# Patient Record
Sex: FEMALE | Race: WHITE | NOT HISPANIC OR LATINO | Employment: OTHER
[De-identification: names, ages, dates, MRNs, and addresses within clinical notes are randomized per-mention and may not be internally consistent; named-entity substitution may affect disease eponyms.]

---

## 2017-07-12 ENCOUNTER — CHARTING TRANS (OUTPATIENT)
Dept: OTHER | Age: 72
End: 2017-07-12

## 2017-08-30 ENCOUNTER — CHARTING TRANS (OUTPATIENT)
Dept: OTHER | Age: 72
End: 2017-08-30

## 2017-09-19 ENCOUNTER — HOSPITAL ENCOUNTER (OUTPATIENT)
Dept: GENERAL RADIOLOGY | Age: 72
Discharge: HOME | End: 2017-09-19
Attending: INTERNAL MEDICINE
Payer: MEDICARE

## 2017-09-19 DIAGNOSIS — M53.3 SACROILIAC PAIN: ICD-10-CM

## 2017-09-19 PROCEDURE — 72202 X-RAY EXAM SI JOINTS 3/> VWS: CPT | Performed by: INTERNAL MEDICINE

## 2017-12-19 ENCOUNTER — HOSPITAL ENCOUNTER (OUTPATIENT)
Dept: MAMMOGRAPHY | Age: 72
Discharge: HOME | End: 2017-12-19
Attending: OBSTETRICS & GYNECOLOGY
Payer: MEDICARE

## 2017-12-19 DIAGNOSIS — Z12.31 VISIT FOR SCREENING MAMMOGRAM: ICD-10-CM

## 2017-12-19 PROCEDURE — 77067 SCR MAMMO BI INCL CAD: CPT | Performed by: OBSTETRICS & GYNECOLOGY

## 2018-05-24 ENCOUNTER — CHARTING TRANS (OUTPATIENT)
Dept: OTHER | Age: 73
End: 2018-05-24

## 2018-06-13 ENCOUNTER — LAB SERVICES (OUTPATIENT)
Dept: OTHER | Age: 73
End: 2018-06-13

## 2018-06-13 ENCOUNTER — CHARTING TRANS (OUTPATIENT)
Dept: OTHER | Age: 73
End: 2018-06-13

## 2018-06-15 ENCOUNTER — CHARTING TRANS (OUTPATIENT)
Dept: OTHER | Age: 73
End: 2018-06-15

## 2018-06-15 LAB
CANDIDA RRNA VAG QL PROBE: NEGATIVE
G VAGINALIS RRNA GENITAL QL PROBE: NEGATIVE
T VAGINALIS RRNA GENITAL QL PROBE: NEGATIVE

## 2018-06-20 ENCOUNTER — CHARTING TRANS (OUTPATIENT)
Dept: OTHER | Age: 73
End: 2018-06-20

## 2018-10-24 ENCOUNTER — HOSPITAL ENCOUNTER (OUTPATIENT)
Dept: CV DIAGNOSTICS | Facility: HOSPITAL | Age: 73
Discharge: HOME | End: 2018-10-24
Attending: INTERNAL MEDICINE
Payer: MEDICARE

## 2018-10-24 DIAGNOSIS — R94.31 NONSPECIFIC ABNORMAL ELECTROCARDIOGRAM (ECG) (EKG): ICD-10-CM

## 2018-10-24 PROCEDURE — 93017 CV STRESS TEST TRACING ONLY: CPT | Performed by: INTERNAL MEDICINE

## 2018-10-24 PROCEDURE — 93018 CV STRESS TEST I&R ONLY: CPT | Performed by: INTERNAL MEDICINE

## 2018-10-24 PROCEDURE — 93350 STRESS TTE ONLY: CPT | Performed by: INTERNAL MEDICINE

## 2018-11-01 ENCOUNTER — CHARTING TRANS (OUTPATIENT)
Dept: OTHER | Age: 73
End: 2018-11-01

## 2018-11-01 VITALS
DIASTOLIC BLOOD PRESSURE: 72 MMHG | HEART RATE: 72 BPM | SYSTOLIC BLOOD PRESSURE: 116 MMHG | BODY MASS INDEX: 18.56 KG/M2 | WEIGHT: 111.38 LBS | HEIGHT: 65 IN

## 2018-11-01 VITALS
WEIGHT: 112.99 LBS | SYSTOLIC BLOOD PRESSURE: 112 MMHG | DIASTOLIC BLOOD PRESSURE: 66 MMHG | HEIGHT: 65 IN | BODY MASS INDEX: 18.82 KG/M2

## 2018-11-01 VITALS
WEIGHT: 111.38 LBS | SYSTOLIC BLOOD PRESSURE: 110 MMHG | BODY MASS INDEX: 18.56 KG/M2 | HEART RATE: 70 BPM | HEIGHT: 65 IN | DIASTOLIC BLOOD PRESSURE: 62 MMHG

## 2018-11-03 VITALS — HEART RATE: 70 BPM | DIASTOLIC BLOOD PRESSURE: 64 MMHG | SYSTOLIC BLOOD PRESSURE: 100 MMHG | WEIGHT: 113.13 LBS

## 2018-11-07 ENCOUNTER — HOSPITAL ENCOUNTER (OUTPATIENT)
Dept: BONE DENSITY | Age: 73
Discharge: HOME | End: 2018-11-07
Attending: OBSTETRICS & GYNECOLOGY
Payer: MEDICARE

## 2018-11-07 DIAGNOSIS — Z78.0 OSTEOPENIA AFTER MENOPAUSE: ICD-10-CM

## 2018-11-07 DIAGNOSIS — M85.80 OSTEOPENIA AFTER MENOPAUSE: ICD-10-CM

## 2018-11-07 PROCEDURE — 77080 DXA BONE DENSITY AXIAL: CPT | Performed by: OBSTETRICS & GYNECOLOGY

## 2018-11-27 VITALS
DIASTOLIC BLOOD PRESSURE: 64 MMHG | BODY MASS INDEX: 18.26 KG/M2 | HEIGHT: 65 IN | WEIGHT: 109.56 LBS | SYSTOLIC BLOOD PRESSURE: 108 MMHG

## 2019-01-01 ENCOUNTER — EXTERNAL RECORD (OUTPATIENT)
Dept: HEALTH INFORMATION MANAGEMENT | Facility: OTHER | Age: 74
End: 2019-01-01

## 2019-01-05 ENCOUNTER — HOSPITAL ENCOUNTER (OUTPATIENT)
Dept: MAMMOGRAPHY | Age: 74
Discharge: HOME | End: 2019-01-05
Attending: OBSTETRICS & GYNECOLOGY
Payer: MEDICARE

## 2019-01-05 DIAGNOSIS — Z12.31 ENCOUNTER FOR SCREENING MAMMOGRAM FOR MALIGNANT NEOPLASM OF BREAST: ICD-10-CM

## 2019-01-05 PROCEDURE — 77067 SCR MAMMO BI INCL CAD: CPT | Performed by: OBSTETRICS & GYNECOLOGY

## 2019-01-05 PROCEDURE — 77063 BREAST TOMOSYNTHESIS BI: CPT | Performed by: OBSTETRICS & GYNECOLOGY

## 2019-02-28 ENCOUNTER — HOSPITAL ENCOUNTER (OUTPATIENT)
Dept: MRI IMAGING | Age: 74
Discharge: HOME | End: 2019-02-28
Attending: INTERNAL MEDICINE
Payer: MEDICARE

## 2019-02-28 DIAGNOSIS — M54.17 LUMBOSACRAL RADICULOPATHY: ICD-10-CM

## 2019-02-28 PROCEDURE — 72148 MRI LUMBAR SPINE W/O DYE: CPT | Performed by: INTERNAL MEDICINE

## 2019-08-13 ENCOUNTER — OFFICE VISIT (OUTPATIENT)
Dept: OBGYN | Age: 74
End: 2019-08-13

## 2019-08-13 VITALS
WEIGHT: 110.56 LBS | BODY MASS INDEX: 18.42 KG/M2 | DIASTOLIC BLOOD PRESSURE: 58 MMHG | HEIGHT: 65 IN | SYSTOLIC BLOOD PRESSURE: 90 MMHG

## 2019-08-13 DIAGNOSIS — M85.851 OSTEOPENIA OF BOTH HIPS: ICD-10-CM

## 2019-08-13 DIAGNOSIS — M85.852 OSTEOPENIA OF BOTH HIPS: ICD-10-CM

## 2019-08-13 DIAGNOSIS — Z12.39 SCREENING FOR BREAST CANCER: Primary | ICD-10-CM

## 2019-08-13 PROCEDURE — G0101 CA SCREEN;PELVIC/BREAST EXAM: HCPCS | Performed by: OBSTETRICS & GYNECOLOGY

## 2019-08-13 RX ORDER — POLYETHYLENE GLYCOL 3350 17 G/17G
34 POWDER, FOR SOLUTION ORAL
COMMUNITY

## 2019-08-13 RX ORDER — DEXTROAMPHETAMINE SACCHARATE, AMPHETAMINE ASPARTATE, DEXTROAMPHETAMINE SULFATE AND AMPHETAMINE SULFATE 2.5; 2.5; 2.5; 2.5 MG/1; MG/1; MG/1; MG/1
1 TABLET ORAL
COMMUNITY

## 2019-08-13 RX ORDER — LEVOTHYROXINE SODIUM 112 UG/1
112 TABLET ORAL DAILY
COMMUNITY

## 2019-08-13 RX ORDER — ARIPIPRAZOLE 2 MG/1
1 TABLET ORAL
COMMUNITY

## 2019-08-13 RX ORDER — FLUOXETINE 20 MG/1
1 TABLET ORAL
COMMUNITY

## 2019-08-13 ASSESSMENT — ENCOUNTER SYMPTOMS
NUMBNESS: 1
CONSTIPATION: 1
RESPIRATORY NEGATIVE: 1
NERVOUS/ANXIOUS: 0
APPETITE CHANGE: 0
HEADACHES: 1
ACTIVITY CHANGE: 0
UNEXPECTED WEIGHT CHANGE: 0
FATIGUE: 0

## 2020-01-01 ENCOUNTER — EXTERNAL RECORD (OUTPATIENT)
Dept: HEALTH INFORMATION MANAGEMENT | Facility: OTHER | Age: 75
End: 2020-01-01

## 2020-01-18 ENCOUNTER — HOSPITAL ENCOUNTER (OUTPATIENT)
Dept: MAMMOGRAPHY | Age: 75
Discharge: HOME | End: 2020-01-18
Attending: OBSTETRICS & GYNECOLOGY
Payer: MEDICARE

## 2020-01-18 DIAGNOSIS — Z12.31 ENCOUNTER FOR SCREENING MAMMOGRAM FOR MALIGNANT NEOPLASM OF BREAST: ICD-10-CM

## 2020-01-18 PROCEDURE — 77067 SCR MAMMO BI INCL CAD: CPT | Performed by: OBSTETRICS & GYNECOLOGY

## 2020-01-18 PROCEDURE — 77063 BREAST TOMOSYNTHESIS BI: CPT | Performed by: OBSTETRICS & GYNECOLOGY

## 2020-01-21 DIAGNOSIS — R92.8 ABNORMAL MAMMOGRAM: Primary | ICD-10-CM

## 2020-01-30 ENCOUNTER — HOSPITAL ENCOUNTER (OUTPATIENT)
Dept: MAMMOGRAPHY | Facility: HOSPITAL | Age: 75
Discharge: HOME | End: 2020-01-30
Attending: OBSTETRICS & GYNECOLOGY
Payer: MEDICARE

## 2020-01-30 DIAGNOSIS — R92.2 INCONCLUSIVE MAMMOGRAM: ICD-10-CM

## 2020-01-30 PROCEDURE — 76642 ULTRASOUND BREAST LIMITED: CPT | Performed by: OBSTETRICS & GYNECOLOGY

## 2020-01-30 PROCEDURE — 77065 DX MAMMO INCL CAD UNI: CPT | Performed by: OBSTETRICS & GYNECOLOGY

## 2020-01-30 PROCEDURE — 77061 BREAST TOMOSYNTHESIS UNI: CPT | Performed by: OBSTETRICS & GYNECOLOGY

## 2020-02-22 ENCOUNTER — HOSPITAL ENCOUNTER (OUTPATIENT)
Dept: BONE DENSITY | Age: 75
Discharge: HOME | End: 2020-02-22
Attending: OBSTETRICS & GYNECOLOGY
Payer: MEDICARE

## 2020-02-22 DIAGNOSIS — M85.851 OSTEOPENIA OF BOTH HIPS: ICD-10-CM

## 2020-02-22 DIAGNOSIS — M85.852 OSTEOPENIA OF BOTH HIPS: ICD-10-CM

## 2020-02-22 PROCEDURE — 77080 DXA BONE DENSITY AXIAL: CPT | Performed by: OBSTETRICS & GYNECOLOGY

## 2020-10-19 ENCOUNTER — APPOINTMENT (OUTPATIENT)
Dept: LAB | Facility: HOSPITAL | Age: 75
End: 2020-10-19
Attending: PHYSICIAN ASSISTANT
Payer: MEDICARE

## 2020-10-19 DIAGNOSIS — Z11.59 ENCOUNTER FOR SCREENING FOR OTHER VIRAL DISEASES: ICD-10-CM

## 2020-10-19 DIAGNOSIS — Z01.818 PREPROCEDURAL EXAMINATION: ICD-10-CM

## 2020-10-22 ENCOUNTER — HOSPITAL ENCOUNTER (OUTPATIENT)
Dept: CV DIAGNOSTICS | Facility: HOSPITAL | Age: 75
Discharge: HOME | End: 2020-10-22
Attending: PHYSICIAN ASSISTANT
Payer: MEDICARE

## 2020-10-22 ENCOUNTER — HOSPITAL ENCOUNTER (OUTPATIENT)
Dept: GENERAL RADIOLOGY | Facility: HOSPITAL | Age: 75
Discharge: HOME | End: 2020-10-22
Attending: PHYSICIAN ASSISTANT
Payer: MEDICARE

## 2020-10-22 DIAGNOSIS — R07.9 CHEST PAIN, UNSPECIFIED TYPE: ICD-10-CM

## 2020-10-22 PROCEDURE — 93350 STRESS TTE ONLY: CPT | Performed by: PHYSICIAN ASSISTANT

## 2020-10-22 PROCEDURE — 93017 CV STRESS TEST TRACING ONLY: CPT | Performed by: PHYSICIAN ASSISTANT

## 2020-10-22 PROCEDURE — 71046 X-RAY EXAM CHEST 2 VIEWS: CPT | Performed by: PHYSICIAN ASSISTANT

## 2020-10-22 PROCEDURE — 93018 CV STRESS TEST I&R ONLY: CPT | Performed by: PHYSICIAN ASSISTANT

## 2021-01-05 ENCOUNTER — TELEPHONE (OUTPATIENT)
Dept: OBGYN | Age: 76
End: 2021-01-05

## 2021-01-13 DIAGNOSIS — Z12.31 ENCOUNTER FOR SCREENING MAMMOGRAM FOR MALIGNANT NEOPLASM OF BREAST: Primary | ICD-10-CM

## 2021-02-03 ENCOUNTER — HOSPITAL ENCOUNTER (OUTPATIENT)
Dept: MAMMOGRAPHY | Age: 76
Discharge: HOME | End: 2021-02-03
Attending: OBSTETRICS & GYNECOLOGY
Payer: MEDICARE

## 2021-02-03 DIAGNOSIS — Z12.31 ENCOUNTER FOR SCREENING MAMMOGRAM FOR MALIGNANT NEOPLASM OF BREAST: ICD-10-CM

## 2021-02-03 PROCEDURE — 77067 SCR MAMMO BI INCL CAD: CPT | Performed by: OBSTETRICS & GYNECOLOGY

## 2021-02-03 PROCEDURE — 77063 BREAST TOMOSYNTHESIS BI: CPT | Performed by: OBSTETRICS & GYNECOLOGY

## 2021-10-12 ENCOUNTER — HOSPITAL ENCOUNTER (OUTPATIENT)
Dept: CT IMAGING | Facility: HOSPITAL | Age: 76
Discharge: HOME | End: 2021-10-12
Attending: UROLOGY
Payer: MEDICARE

## 2021-10-12 DIAGNOSIS — N13.30 HYDRONEPHROSIS, UNSPECIFIED HYDRONEPHROSIS TYPE: ICD-10-CM

## 2021-10-12 PROCEDURE — 76377 3D RENDER W/INTRP POSTPROCES: CPT | Performed by: UROLOGY

## 2021-10-12 PROCEDURE — 82565 ASSAY OF CREATININE: CPT

## 2021-10-12 PROCEDURE — 74178 CT ABD&PLV WO CNTR FLWD CNTR: CPT | Performed by: UROLOGY

## 2021-10-25 PROCEDURE — 88305 TISSUE EXAM BY PATHOLOGIST: CPT | Performed by: INTERNAL MEDICINE

## 2021-11-02 ENCOUNTER — OFFICE VISIT (OUTPATIENT)
Dept: OBGYN | Age: 76
End: 2021-11-02

## 2021-11-02 VITALS
SYSTOLIC BLOOD PRESSURE: 107 MMHG | DIASTOLIC BLOOD PRESSURE: 72 MMHG | HEART RATE: 101 BPM | BODY MASS INDEX: 18.86 KG/M2 | HEIGHT: 64 IN | WEIGHT: 110.45 LBS

## 2021-11-02 DIAGNOSIS — Z12.31 ENCOUNTER FOR SCREENING MAMMOGRAM FOR MALIGNANT NEOPLASM OF BREAST: Primary | ICD-10-CM

## 2021-11-02 DIAGNOSIS — Z23 NEED FOR INFLUENZA VACCINATION: ICD-10-CM

## 2021-11-02 PROCEDURE — G0008 ADMIN INFLUENZA VIRUS VAC: HCPCS

## 2021-11-02 PROCEDURE — 90686 IIV4 VACC NO PRSV 0.5 ML IM: CPT

## 2021-11-02 PROCEDURE — G0101 CA SCREEN;PELVIC/BREAST EXAM: HCPCS | Performed by: OBSTETRICS & GYNECOLOGY

## 2021-11-02 ASSESSMENT — ENCOUNTER SYMPTOMS
CONSTIPATION: 1
FATIGUE: 0
UNEXPECTED WEIGHT CHANGE: 1
LIGHT-HEADEDNESS: 1
DIARRHEA: 0
ACTIVITY CHANGE: 0
ABDOMINAL DISTENTION: 1
CHILLS: 0
FEVER: 0
APPETITE CHANGE: 0
NERVOUS/ANXIOUS: 0
RESPIRATORY NEGATIVE: 1
DIZZINESS: 0
BACK PAIN: 1

## 2022-02-04 ENCOUNTER — HOSPITAL ENCOUNTER (OUTPATIENT)
Dept: MAMMOGRAPHY | Age: 77
Discharge: HOME | End: 2022-02-04
Attending: OBSTETRICS & GYNECOLOGY
Payer: MEDICARE

## 2022-02-04 DIAGNOSIS — Z12.31 ENCOUNTER FOR SCREENING MAMMOGRAM FOR MALIGNANT NEOPLASM OF BREAST: ICD-10-CM

## 2022-02-04 PROCEDURE — 77063 BREAST TOMOSYNTHESIS BI: CPT | Performed by: OBSTETRICS & GYNECOLOGY

## 2022-02-04 PROCEDURE — 77067 SCR MAMMO BI INCL CAD: CPT | Performed by: OBSTETRICS & GYNECOLOGY

## 2022-12-19 ENCOUNTER — HOSPITAL ENCOUNTER (OUTPATIENT)
Dept: CT IMAGING | Facility: HOSPITAL | Age: 77
Discharge: HOME | End: 2022-12-19
Attending: INTERNAL MEDICINE
Payer: MEDICARE

## 2022-12-19 DIAGNOSIS — R91.1 PULMONARY NODULE: ICD-10-CM

## 2022-12-19 PROCEDURE — 71250 CT THORAX DX C-: CPT | Performed by: INTERNAL MEDICINE

## 2023-02-06 ENCOUNTER — HOSPITAL ENCOUNTER (OUTPATIENT)
Dept: MAMMOGRAPHY | Age: 78
Discharge: HOME | End: 2023-02-06
Attending: INTERNAL MEDICINE
Payer: MEDICARE

## 2023-02-06 DIAGNOSIS — Z12.31 ENCOUNTER FOR SCREENING MAMMOGRAM FOR MALIGNANT NEOPLASM OF BREAST: ICD-10-CM

## 2023-02-06 PROCEDURE — 77067 SCR MAMMO BI INCL CAD: CPT | Performed by: INTERNAL MEDICINE

## 2023-02-06 PROCEDURE — 77063 BREAST TOMOSYNTHESIS BI: CPT | Performed by: INTERNAL MEDICINE

## 2023-05-23 ENCOUNTER — HOSPITAL ENCOUNTER (OUTPATIENT)
Dept: BONE DENSITY | Age: 78
Discharge: HOME | End: 2023-05-23
Attending: INTERNAL MEDICINE
Payer: MEDICARE

## 2023-05-23 DIAGNOSIS — M85.89 OSTEOPENIA OF MULTIPLE SITES: ICD-10-CM

## 2023-05-23 DIAGNOSIS — Z78.0 POSTMENOPAUSAL: ICD-10-CM

## 2023-05-23 PROCEDURE — 77080 DXA BONE DENSITY AXIAL: CPT | Performed by: INTERNAL MEDICINE

## 2023-10-24 ENCOUNTER — E-ADVICE (OUTPATIENT)
Dept: OTHER | Age: 78
End: 2023-10-24

## 2023-11-03 ENCOUNTER — OFFICE VISIT (OUTPATIENT)
Dept: OBGYN | Age: 78
End: 2023-11-03

## 2023-11-03 VITALS
DIASTOLIC BLOOD PRESSURE: 85 MMHG | WEIGHT: 106.37 LBS | BODY MASS INDEX: 18.26 KG/M2 | OXYGEN SATURATION: 100 % | RESPIRATION RATE: 16 BRPM | SYSTOLIC BLOOD PRESSURE: 105 MMHG

## 2023-11-03 DIAGNOSIS — Z12.31 ENCOUNTER FOR SCREENING MAMMOGRAM FOR MALIGNANT NEOPLASM OF BREAST: Primary | ICD-10-CM

## 2023-11-03 DIAGNOSIS — N89.8 VAGINAL ODOR: ICD-10-CM

## 2023-11-03 DIAGNOSIS — N89.8 VAGINAL DISCHARGE: ICD-10-CM

## 2023-11-03 PROCEDURE — 87481 CANDIDA DNA AMP PROBE: CPT | Performed by: CLINICAL MEDICAL LABORATORY

## 2023-11-03 PROCEDURE — 87661 TRICHOMONAS VAGINALIS AMPLIF: CPT | Performed by: CLINICAL MEDICAL LABORATORY

## 2023-11-03 PROCEDURE — 81513 NFCT DS BV RNA VAG FLU ALG: CPT | Performed by: CLINICAL MEDICAL LABORATORY

## 2023-11-03 PROCEDURE — G0101 CA SCREEN;PELVIC/BREAST EXAM: HCPCS | Performed by: OBSTETRICS & GYNECOLOGY

## 2023-11-03 RX ORDER — ROSUVASTATIN CALCIUM 20 MG/1
20 TABLET, COATED ORAL DAILY
COMMUNITY

## 2023-11-03 ASSESSMENT — ENCOUNTER SYMPTOMS
DIARRHEA: 0
CONSTIPATION: 1
UNEXPECTED WEIGHT CHANGE: 1
ACTIVITY CHANGE: 1
NAUSEA: 0
RESPIRATORY NEGATIVE: 1
FEVER: 0
APPETITE CHANGE: 0
NERVOUS/ANXIOUS: 0
ABDOMINAL DISTENTION: 1
CHILLS: 0

## 2023-11-03 ASSESSMENT — PATIENT HEALTH QUESTIONNAIRE - PHQ9
SUM OF ALL RESPONSES TO PHQ9 QUESTIONS 1 AND 2: 0
2. FEELING DOWN, DEPRESSED OR HOPELESS: NOT AT ALL
SUM OF ALL RESPONSES TO PHQ9 QUESTIONS 1 AND 2: 0
1. LITTLE INTEREST OR PLEASURE IN DOING THINGS: NOT AT ALL
CLINICAL INTERPRETATION OF PHQ2 SCORE: NO FURTHER SCREENING NEEDED

## 2023-11-03 ASSESSMENT — PAIN SCALES - GENERAL: PAINLEVEL: 0

## 2023-11-06 LAB
BACTERIAL VAGINOSIS VAG-IMP: NOT DETECTED
C ALBICANS DNA VAG QL NAA+PROBE: NOT DETECTED
C GLABRATA DNA VAG QL NAA+PROBE: NOT DETECTED
SERVICE CMNT-IMP: NORMAL
T VAGINALIS DNA VAG QL NAA+PROBE: NOT DETECTED

## 2024-02-07 ENCOUNTER — HOSPITAL ENCOUNTER (OUTPATIENT)
Dept: MAMMOGRAPHY | Age: 79
Discharge: HOME | End: 2024-02-07
Attending: OBSTETRICS & GYNECOLOGY
Payer: MEDICARE

## 2024-02-07 DIAGNOSIS — Z12.31 ENCOUNTER FOR SCREENING MAMMOGRAM FOR MALIGNANT NEOPLASM OF BREAST: ICD-10-CM

## 2024-02-07 PROCEDURE — 77063 BREAST TOMOSYNTHESIS BI: CPT | Performed by: OBSTETRICS & GYNECOLOGY

## 2024-02-07 PROCEDURE — 77067 SCR MAMMO BI INCL CAD: CPT | Performed by: OBSTETRICS & GYNECOLOGY

## 2024-03-26 ENCOUNTER — TELEPHONE (OUTPATIENT)
Dept: OBGYN | Age: 79
End: 2024-03-26

## 2024-05-13 ENCOUNTER — TELEPHONE (OUTPATIENT)
Dept: OBGYN | Age: 79
End: 2024-05-13

## 2024-05-14 ENCOUNTER — OFFICE VISIT (OUTPATIENT)
Dept: OBGYN | Age: 79
End: 2024-05-14

## 2024-05-14 VITALS — BODY MASS INDEX: 17.86 KG/M2 | WEIGHT: 104.06 LBS

## 2024-05-14 DIAGNOSIS — N95.0 POSTMENOPAUSAL BLEEDING: Primary | ICD-10-CM

## 2024-05-14 RX ORDER — FLUOXETINE HYDROCHLORIDE 20 MG/1
20 CAPSULE ORAL EVERY MORNING
COMMUNITY

## 2024-05-14 ASSESSMENT — PATIENT HEALTH QUESTIONNAIRE - PHQ9
2. FEELING DOWN, DEPRESSED OR HOPELESS: NOT AT ALL
1. LITTLE INTEREST OR PLEASURE IN DOING THINGS: NOT AT ALL
SUM OF ALL RESPONSES TO PHQ9 QUESTIONS 1 AND 2: 0
SUM OF ALL RESPONSES TO PHQ9 QUESTIONS 1 AND 2: 0
CLINICAL INTERPRETATION OF PHQ2 SCORE: NO FURTHER SCREENING NEEDED

## 2024-05-14 ASSESSMENT — ENCOUNTER SYMPTOMS
NERVOUS/ANXIOUS: 0
BLOOD IN STOOL: 0
ROS GI COMMENTS: HEMORRHOIDS
CONSTITUTIONAL NEGATIVE: 1
COLOR CHANGE: 0
WOUND: 1

## 2024-05-23 ENCOUNTER — APPOINTMENT (OUTPATIENT)
Dept: ULTRASOUND IMAGING | Age: 79
End: 2024-05-23
Attending: OBSTETRICS & GYNECOLOGY

## 2024-05-23 DIAGNOSIS — N95.0 POSTMENOPAUSAL BLEEDING: ICD-10-CM

## 2024-05-23 PROCEDURE — 76856 US EXAM PELVIC COMPLETE: CPT | Performed by: OBSTETRICS & GYNECOLOGY

## 2024-05-23 PROCEDURE — 76830 TRANSVAGINAL US NON-OB: CPT | Performed by: OBSTETRICS & GYNECOLOGY

## 2024-11-11 ENCOUNTER — OFFICE VISIT (OUTPATIENT)
Dept: UROLOGY | Facility: CLINIC | Age: 79
End: 2024-11-11
Attending: OBSTETRICS & GYNECOLOGY
Payer: MEDICARE

## 2024-11-11 VITALS
DIASTOLIC BLOOD PRESSURE: 70 MMHG | BODY MASS INDEX: 18.61 KG/M2 | TEMPERATURE: 98 F | HEIGHT: 63 IN | WEIGHT: 105 LBS | SYSTOLIC BLOOD PRESSURE: 100 MMHG

## 2024-11-11 DIAGNOSIS — K59.00 CONSTIPATION: ICD-10-CM

## 2024-11-11 DIAGNOSIS — R35.1 NOCTURIA: ICD-10-CM

## 2024-11-11 DIAGNOSIS — N81.11 CYSTOCELE, MIDLINE: Primary | ICD-10-CM

## 2024-11-11 DIAGNOSIS — N81.6 RECTOCELE: ICD-10-CM

## 2024-11-11 PROCEDURE — 99202 OFFICE O/P NEW SF 15 MIN: CPT

## 2024-11-11 RX ORDER — ESTRADIOL 0.1 MG/G
CREAM VAGINAL
Qty: 42.5 G | Refills: 3 | Status: SHIPPED | OUTPATIENT
Start: 2024-11-11

## 2024-11-11 RX ORDER — DORZOLAMIDE HYDROCHLORIDE AND TIMOLOL MALEATE 20; 5 MG/ML; MG/ML
1 SOLUTION/ DROPS OPHTHALMIC 2 TIMES DAILY
COMMUNITY
Start: 2024-02-05

## 2024-11-11 RX ORDER — LEVOTHYROXINE SODIUM 100 UG/1
100 TABLET ORAL DAILY
COMMUNITY
Start: 2024-09-09

## 2024-11-11 RX ORDER — POLYETHYLENE GLYCOL 3350 17 G/17G
17 POWDER, FOR SOLUTION ORAL 2 TIMES DAILY
COMMUNITY

## 2024-11-11 RX ORDER — LATANOPROST 50 UG/ML
1 SOLUTION/ DROPS OPHTHALMIC NIGHTLY
COMMUNITY
Start: 2023-03-20

## 2024-11-11 RX ORDER — ROSUVASTATIN CALCIUM 20 MG/1
20 TABLET, COATED ORAL EVERY OTHER DAY
COMMUNITY
Start: 2024-08-31

## 2025-02-10 ENCOUNTER — OFFICE VISIT (OUTPATIENT)
Dept: UROLOGY | Facility: CLINIC | Age: 80
End: 2025-02-10
Attending: OBSTETRICS & GYNECOLOGY
Payer: MEDICARE

## 2025-02-10 VITALS — RESPIRATION RATE: 16 BRPM | TEMPERATURE: 98 F | BODY MASS INDEX: 19 KG/M2 | HEIGHT: 63 IN

## 2025-02-10 DIAGNOSIS — N39.41 URGE URINARY INCONTINENCE: ICD-10-CM

## 2025-02-10 DIAGNOSIS — R39.14 FEELING OF INCOMPLETE BLADDER EMPTYING: ICD-10-CM

## 2025-02-10 DIAGNOSIS — R35.1 NOCTURIA: ICD-10-CM

## 2025-02-10 DIAGNOSIS — N81.2 INCOMPLETE UTEROVAGINAL PROLAPSE: Primary | ICD-10-CM

## 2025-02-10 DIAGNOSIS — N81.84 PELVIC MUSCLE WASTING: ICD-10-CM

## 2025-02-10 DIAGNOSIS — N95.2 POSTMENOPAUSAL ATROPHIC VAGINITIS: ICD-10-CM

## 2025-02-10 PROCEDURE — 99212 OFFICE O/P EST SF 10 MIN: CPT

## 2025-02-10 PROCEDURE — 51701 INSERT BLADDER CATHETER: CPT | Performed by: PHYSICIAN ASSISTANT

## 2025-02-10 RX ORDER — VIBEGRON 75 MG/1
1 TABLET, FILM COATED ORAL DAILY
Qty: 90 TABLET | Refills: 3 | Status: SHIPPED | OUTPATIENT
Start: 2025-02-10

## 2025-02-10 NOTE — PROGRESS NOTES
Pt presents for f/u of pelvic organ prolapse, pessary care  Using #5 ring with support pessary, office care    Reports pessary is comfortable   Denies discharge  Denies bleeding  Denies s/sx of UTI  Bowels reg  Pt is using vaginal estrogen cream 3x weekly    Happy with pessary, feels supported  She is not currently interested in surgical management of her pelvic organ prolapse    Reports more bothersome UUI  Nocturia x3  Has tried anticholinergic meds in past (worsening constipation)    Urogynecology Summary:  Urogynecology Summary  Prolapse: Yes  GILMER: Yes  Urge Incontinence: Yes  Nocturia Frequency: 3  Frequency: 2 hours  Incomplete emptying: Yes (occasionally)  Constipation: Yes  Wears pad day?: 3 (light)  Wears Pad Night?: 1 (light)  Activities are limited by UI/POP?: No  Currently Sexually Active: No      Vitals:  Vitals:    02/10/25 1032   Resp: 16   Temp: 97.8 °F (36.6 °C)       GENERAL EXAM:  GENERAL: alert & oriented, NAD  HEENT: NC/AT, sclera without injection  SKIN: no rashes  LUNGS:  without increased respiratory effort  ABDOMEN: soft, non-tender, non-distended, no masses appreciated  EXT: no edema    PELVIC EXAM: HERMES Busch, present for exam as a chaperone  Ext. Gen: +atrophy, no lesions  Urethra: +atrophy, nontender  Bladder: no fullness, nontender  Vagina: +atrophy, no lesions, no ulcerations, no bleeding  Cervix: wnl, no lesions, no bleeding  Uterus: +mobile  Perineum: intact, nontender  Anus: wnl  Rectum: defer     PELVIS FLOOR NEUROMUSCULAR FUNCTION:  Strength: 2  Perineal Sensation: intact    PELVIC SUPPORT:  [place]: 2  Ant: 2  Post: 2  CST: neg  UVJ: not hypermobile    Pt voided 100 mL in the privacy of the bathroom  After obtaining verbal consent from the patient, sterile technique used to prep urethra and collect urine.  PVR 20 mL.    Her pessary was removed, cleaned, and reinserted without difficulty.    Impression:    ICD-10-CM    1. Incomplete uterovaginal prolapse  N81.2       2. Postmenopausal  atrophic vaginitis  N95.2       3. Pelvic muscle wasting  N81.84       4. Urge urinary incontinence  N39.41 Vibegron (GEMTESA) 75 MG Oral Tab      5. Nocturia  R35.1 Vibegron (GEMTESA) 75 MG Oral Tab      6. Feeling of incomplete bladder emptying  R39.14 Straight Cath PVR          Discussion Items:   Discussed mgmt of vulvovaginal atrophy with vaginal estrogen cream. Reviewed associated benefits, risks, alternatives, and goals. Recommend low dose 3x weekly mgmt.     Discussed management of pelvic organ prolapse including but not limited to behavioral modifications, conservative options, and surgical management.   Discussed pessary management including benefits and risks. Discussed importance of keeping regularly scheduled pessary checks in prevention of complications related to pessary use.     Discussed dietary and behavioral modifications for mgmt of urinary symptoms.  Discussed weight management and benefits of weight loss on urinary symptoms.  Reviewed AUA/SUFU guidelines on mgmt of non-neurogenic OAB.  Discussed pharmacologic and nonpharmacologic mgmt options of urinary symptoms - reviewed risks, benefits, alternatives, and goals of treatment.  Discussed specific risks related to OAB meds including, but not limited to dry mouth, constipation, blurry vision, cognitive changes, and BP elevation.      Treatment Plan:  Start Gemtesa 75 mg  -- avoid mirabegron d/t drug interactions and anticholinergics d/t age and potential SEs  Continue pessary management, office care  Continue vag estrogen as prescribed  Encouraged daily pelvic exercises  Cont bowel regimen  Bladder diet/drill  Call with s/sx of UTI, problems with pessary     All questions answered  Pt understands and agrees to plan       Return in about 6 weeks (around 3/24/2025) for UUI (phone), and 3 months for pessary care.      Brenda Magana PA-C    Note to patient: The 21st Century Cures Act makes medical notes like these available to patients in the  interest of transparency.  However, be advised this is a medical document.  It is intended as peer to peer communication.  It is written in medical language and may contain abbreviations or verbiage that are unfamiliar.  It may appear blunt or direct.  Medical documents are intended to carry relevant information, facts as evident, and the clinical opinion of the practitioner.

## 2025-02-11 ENCOUNTER — TELEPHONE (OUTPATIENT)
Dept: UROLOGY | Facility: CLINIC | Age: 80
End: 2025-02-11

## 2025-02-11 ENCOUNTER — HOSPITAL ENCOUNTER (OUTPATIENT)
Dept: MAMMOGRAPHY | Age: 80
Discharge: HOME | End: 2025-02-11
Attending: INTERNAL MEDICINE
Payer: MEDICARE

## 2025-02-11 DIAGNOSIS — Z12.31 ENCOUNTER FOR SCREENING MAMMOGRAM FOR MALIGNANT NEOPLASM OF BREAST: ICD-10-CM

## 2025-02-11 PROCEDURE — 77063 BREAST TOMOSYNTHESIS BI: CPT | Performed by: INTERNAL MEDICINE

## 2025-02-11 PROCEDURE — 77067 SCR MAMMO BI INCL CAD: CPT | Performed by: INTERNAL MEDICINE

## 2025-02-11 NOTE — TELEPHONE ENCOUNTER
TC from pt reporting gemtesa will cost $196/mo.  Per CARLITA Castro notes, myrbetriq is contraindicated d/t other medications. Has tried anticholinergics w/ c/o constipation.   Pt willing to try trospium. Pt has hx constipation, taking benefiber and miralax BID already.   Discussed PTNS. Pt interested in this. Will send information to pt today.

## 2025-02-12 RX ORDER — TROSPIUM CHLORIDE ER 60 MG/1
60 CAPSULE ORAL DAILY
Qty: 30 CAPSULE | Refills: 11 | Status: SHIPPED | OUTPATIENT
Start: 2025-02-12

## 2025-02-12 NOTE — TELEPHONE ENCOUNTER
Called pt, LMOR with new orders.  Pt to keep scheduled f/u with AIDE ZAZUETA 3/24/25  Pt to call with questions

## 2025-03-24 ENCOUNTER — VIRTUAL PHONE E/M (OUTPATIENT)
Dept: UROLOGY | Facility: CLINIC | Age: 80
End: 2025-03-24
Attending: OBSTETRICS & GYNECOLOGY
Payer: MEDICARE

## 2025-03-24 DIAGNOSIS — N81.84 PELVIC MUSCLE WASTING: ICD-10-CM

## 2025-03-24 DIAGNOSIS — R35.1 NOCTURIA: ICD-10-CM

## 2025-03-24 DIAGNOSIS — N95.2 POSTMENOPAUSAL ATROPHIC VAGINITIS: ICD-10-CM

## 2025-03-24 DIAGNOSIS — N39.41 URGE URINARY INCONTINENCE: Primary | ICD-10-CM

## 2025-03-24 NOTE — PROGRESS NOTES
This visit is being conducted as a phone (audio only) visit with patient's consent.  Patient declined video visit due to technical capacity.  Patient is in a safe, private environment for televisit, provider located in the office setting.    Visit for f/u of UUI    Rx'd trospium ER 60 mg last visit  Denies SEs but stopped after one week d/t lack of improvement  Willing to retry    Using vag estrogen 3x weekly    Bowels reg     Denies any current signs or symptoms of UTI    Previously tried:  Gemtesa (unable to fill d/t cost 3/25)    Impression/Plan:    ICD-10-CM    1. Urge urinary incontinence  N39.41       2. Nocturia  R35.1       3. Postmenopausal atrophic vaginitis  N95.2       4. Pelvic muscle wasting  N81.84           Discussion Items:   Discussed dietary and behavioral modifications for mgmt of urinary symptoms.  Discussed weight management and benefits of weight loss on urinary symptoms.  Reviewed AUA/SUFU guidelines on mgmt of non-neurogenic OAB.  Discussed pharmacologic and nonpharmacologic mgmt options of urinary symptoms - reviewed risks, benefits, alternatives, and goals of treatment.  Discussed specific risks related to OAB meds including, but not limited to dry mouth, constipation, blurry vision, cognitive changes, and BP elevation.  -- avoid mirabegron d/t drug interaction with aripiprazole    Treatment Plan:  Restart trospium ER 60 mg daily on empty stomach  Continue vag estrogen as prescribed  Bowel regimen  Bladder diet/drill  Pelvic floor exercises  Call with s/sx of UTI    All questions answered  She understands and agrees to plan    Return in about 2 months (around 5/24/2025) for UUI (as already scheduled).    Brenda Magana PA-C    A minimum of 10 minutes was spent on this encounter including chart review, discussion with patient/family, and documentation.    Note to patient: The 21st Century Cures Act makes medical notes like these available to patients in the interest of transparency.   However, be advised this is a medical document.  It is intended as peer to peer communication.  It is written in medical language and may contain abbreviations or verbiage that are unfamiliar.  It may appear blunt or direct.  Medical documents are intended to carry relevant information, facts as evident, and the clinical opinion of the practitioner.

## 2025-05-21 ENCOUNTER — OFFICE VISIT (OUTPATIENT)
Dept: UROLOGY | Facility: CLINIC | Age: 80
End: 2025-05-21
Attending: OBSTETRICS & GYNECOLOGY
Payer: MEDICARE

## 2025-05-21 VITALS — BODY MASS INDEX: 19 KG/M2 | TEMPERATURE: 98 F | RESPIRATION RATE: 16 BRPM | HEIGHT: 63 IN

## 2025-05-21 DIAGNOSIS — N39.41 URGE URINARY INCONTINENCE: ICD-10-CM

## 2025-05-21 DIAGNOSIS — R35.1 NOCTURIA: ICD-10-CM

## 2025-05-21 DIAGNOSIS — N81.84 PELVIC MUSCLE WASTING: ICD-10-CM

## 2025-05-21 DIAGNOSIS — N95.2 POSTMENOPAUSAL ATROPHIC VAGINITIS: ICD-10-CM

## 2025-05-21 DIAGNOSIS — N81.2 INCOMPLETE UTEROVAGINAL PROLAPSE: Primary | ICD-10-CM

## 2025-05-21 PROCEDURE — 99212 OFFICE O/P EST SF 10 MIN: CPT

## 2025-05-21 NOTE — PROGRESS NOTES
Pt presents for f/u of pelvic organ prolapse, pessary care  Using #5 ring with support pessary, office care    Reports pessary is comfortable   Denies discharge  Denies bleeding  Denies s/sx of UTI  Bowels soft but reports straining, using miralax  Pt is using vaginal estrogen cream 3x weekly    Happy with pessary, feels supported  She is not currently interested in surgical management of her pelvic organ prolapse    Reports UUI  Tried trospium ER 60 mg for 1-2 weeks, stopped  Not keen on meds    Urogynecology Summary:  Urogynecology Summary  Prolapse: Yes  GILMER: No  Urge Incontinence: Yes (occasionally)  Nocturia Frequency: 3  Frequency: 2 hours  Incomplete emptying: No  Constipation: Yes  Wears pad day?: 2 (light)  Wears Pad Night?: 1 (light)  Activities are limited by UI/POP?: No  Currently Sexually Active: No      Vitals:  Vitals:    05/21/25 1359   Resp: 16   Temp: 97.8 °F (36.6 °C)       GENERAL EXAM:  GENERAL: alert & oriented, NAD  HEENT: NC/AT, sclera without injection  SKIN: no rashes  LUNGS:  without increased respiratory effort  ABDOMEN: soft, non-tender, non-distended, no masses appreciated  EXT: no edema    PELVIC EXAM: HERMES Busch, present for exam as a chaperone  Ext. Gen: +atrophy, no lesions  Urethra: +atrophy, nontender  Bladder: no fullness, nontender  Vagina: +atrophy, no lesions, no ulcerations, no bleeding  Cervix: wnl, no lesions, no bleeding  Uterus: +mobile  Perineum: intact, nontender  Anus: wnl  Rectum: defer     PELVIS FLOOR NEUROMUSCULAR FUNCTION:  Strength: 2  Perineal Sensation: intact     PELVIC SUPPORT:  [place]: 2  Ant: 2  Post: 2  CST: neg  UVJ: not hypermobile    Her pessary was removed, cleaned, and reinserted without difficulty.    Impression:    ICD-10-CM    1. Incomplete uterovaginal prolapse  N81.2       2. Postmenopausal atrophic vaginitis  N95.2       3. Pelvic muscle wasting  N81.84       4. Urge urinary incontinence  N39.41       5. Nocturia  R35.1           Discussion Items:    Bowel management reviewed. Discussed using fiber daily w/ addition of miralax as needed.    Discussed mgmt of vulvovaginal atrophy with vaginal estrogen cream. Reviewed associated benefits, risks, alternatives, and goals. Recommend low dose 3x weekly mgmt.     Discussed management of pelvic organ prolapse including but not limited to behavioral modifications, conservative options, and surgical management.   Discussed pessary management including benefits and risks. Discussed importance of keeping regularly scheduled pessary checks in prevention of complications related to pessary use.     Discussed management options for OAB/UUI including risks/benefits of expectant management, vaginal estrogen cream, PFPT, OAB meds, PTNS.  -- some interest in PTNS, handout provided    Treatment Plan:  Continue pessary management, office care  Continue vag estrogen as prescribed  Encouraged daily pelvic exercises  Cont bowel regimen  Call with s/sx of UTI, problems with pessary     All questions answered  Pt understands and agrees to plan       Return in about 3 months (around 8/21/2025) for pessary care.      Bernda Magana PA-C    Note to patient: The 21st Century Cures Act makes medical notes like these available to patients in the interest of transparency.  However, be advised this is a medical document.  It is intended as peer to peer communication.  It is written in medical language and may contain abbreviations or verbiage that are unfamiliar.  It may appear blunt or direct.  Medical documents are intended to carry relevant information, facts as evident, and the clinical opinion of the practitioner.

## 2025-06-09 ENCOUNTER — OFFICE VISIT (OUTPATIENT)
Dept: UROLOGY | Facility: CLINIC | Age: 80
End: 2025-06-09
Attending: OBSTETRICS & GYNECOLOGY
Payer: MEDICARE

## 2025-06-09 VITALS — RESPIRATION RATE: 16 BRPM | TEMPERATURE: 98 F | BODY MASS INDEX: 19 KG/M2 | HEIGHT: 63 IN

## 2025-06-09 DIAGNOSIS — N95.2 POSTMENOPAUSAL ATROPHIC VAGINITIS: ICD-10-CM

## 2025-06-09 DIAGNOSIS — N95.0 PMB (POSTMENOPAUSAL BLEEDING): ICD-10-CM

## 2025-06-09 DIAGNOSIS — N81.84 PELVIC MUSCLE WASTING: ICD-10-CM

## 2025-06-09 DIAGNOSIS — N81.2 INCOMPLETE UTEROVAGINAL PROLAPSE: Primary | ICD-10-CM

## 2025-06-09 PROCEDURE — 99212 OFFICE O/P EST SF 10 MIN: CPT

## 2025-06-09 NOTE — PROGRESS NOTES
Pt presents for f/u of pelvic organ prolapse, pessary care  Using #5 ring with support pessary, home care    Reports blood on pessary after removing yesterday    Pt had pelvic US done 5/2024 through GYN office - endometrial thickness 2 mm     Reports pessary is comfortable, denies pain  Denies discharge  Denies s/sx of UTI  Removing pessary at home q 2 weeks  Bowels constipated  Pt is using vaginal estrogen cream 3x weekly    Happy with pessary, feels supported  She is not currently interested in surgical management of her pelvic organ prolapse    Urogynecology Summary:  Urogynecology Summary  Prolapse: Yes  GILMER: Yes  Urge Incontinence: Yes  Nocturia Frequency: 3  Frequency: 2 hours  Incomplete emptying: Yes  Constipation: Yes  Wears pad day?: 2 (light)  Wears Pad Night?: 1 (light)  Activities are limited by UI/POP?: No      Vitals:  Vitals:    06/09/25 1446   Resp: 16   Temp: 97.8 °F (36.6 °C)       GENERAL EXAM:  GENERAL: alert & oriented, NAD  HEENT: NC/AT, sclera without injection  SKIN: no rashes  LUNGS:  without increased respiratory effort  ABDOMEN: soft, non-tender, non-distended, no masses appreciated  EXT: no edema    PELVIC EXAM: HERMES Busch, present for exam as a chaperone  Ext. Gen: +atrophy, no lesions  Urethra: +atrophy, nontender  Bladder: no fullness, nontender  Vagina: +atrophy, no lesions, no ulcerations, no bleeding  Cervix: wnl, no lesions, no bleeding  Uterus: +mobile  Perineum: intact, nontender  Anus: wnl  Rectum: defer     PELVIS FLOOR NEUROMUSCULAR FUNCTION:  Strength: 2  Perineal Sensation: intact     PELVIC SUPPORT:  [place]: 2  Ant: 2  Post: 2  CST: neg  UVJ: not hypermobile    Her pessary was removed, cleaned, and reinserted without difficulty.    Impression:    ICD-10-CM    1. Incomplete uterovaginal prolapse  N81.2       2. PMB (postmenopausal bleeding)  N95.0 US PELVIS W EV (CPT=76856/45132)      3. Postmenopausal atrophic vaginitis  N95.2       4. Pelvic muscle wasting  N81.84            Discussion Items:   Discussed mgmt of vulvovaginal atrophy with vaginal estrogen cream. Reviewed associated benefits, risks, alternatives, and goals. Recommend low dose 3x weekly mgmt.     Bowel management reviewed. Discussed using fiber daily w/ addition of miralax as needed.    Discussed management of pelvic organ prolapse including but not limited to behavioral modifications, conservative options, and surgical management.   Discussed pessary management including benefits and risks. Discussed importance of keeping regularly scheduled pessary checks in prevention of complications related to pessary use.     Discussed further eval of PMB with repeat pelvic US    Treatment Plan:  Pelvic US to eval PMB  Continue pessary management, home care  Continue vag estrogen as prescribed  Encouraged daily pelvic exercises  Cont bowel regimen  Call with s/sx of UTI, problems with pessary     All questions answered  Pt understands and agrees to plan       Return in about 3 months (around 9/9/2025) for pessary check.      Brenda Magana PA-C    Note to patient: The 21st Century Cures Act makes medical notes like these available to patients in the interest of transparency.  However, be advised this is a medical document.  It is intended as peer to peer communication.  It is written in medical language and may contain abbreviations or verbiage that are unfamiliar.  It may appear blunt or direct.  Medical documents are intended to carry relevant information, facts as evident, and the clinical opinion of the practitioner.

## 2025-06-20 ENCOUNTER — TELEPHONE (OUTPATIENT)
Dept: UROLOGY | Facility: CLINIC | Age: 80
End: 2025-06-20

## 2025-06-20 NOTE — TELEPHONE ENCOUNTER
Pt returned call.  Patient states she recently added benefiber but is experiencing bloating. Advised to increase fiber slowly to avoid bloating. Reviewed bowel regimen. Pt verbalized an understanding and agrees w/ plan. All questions answered.

## 2025-07-02 ENCOUNTER — HOSPITAL ENCOUNTER (OUTPATIENT)
Dept: ULTRASOUND IMAGING | Age: 80
Discharge: HOME | End: 2025-07-02
Attending: PHYSICIAN ASSISTANT
Payer: MEDICARE

## 2025-07-02 DIAGNOSIS — N95.0 PMB (POSTMENOPAUSAL BLEEDING): ICD-10-CM

## 2025-07-02 PROCEDURE — 76856 US EXAM PELVIC COMPLETE: CPT | Performed by: PHYSICIAN ASSISTANT

## 2025-07-02 PROCEDURE — 76830 TRANSVAGINAL US NON-OB: CPT | Performed by: PHYSICIAN ASSISTANT

## 2025-07-03 ENCOUNTER — TELEPHONE (OUTPATIENT)
Dept: UROLOGY | Facility: HOSPITAL | Age: 80
End: 2025-07-03

## 2025-07-03 NOTE — TELEPHONE ENCOUNTER
TC to pt with ultrasound results    Pt had pelvic US done 5/2024 through GYN office - endometrial thickness 2 mm     Pessary  PMB, pelvic ultrasound  Endometrial thickness is upper normal for postmenopausal patient measuring 5 mm. Endometrium demonstrates cystic foci and fluid     Offered EMB vs [facility] D&C, poss polypectomy vs TVH w/ repairs    Discussed urodynamics    Discussed management options for GILMER including expectant management, pelvic floor PT, pessary, and surgery  Discussed risks and benefits associated with each option  Discussed urodynamic testing    Discussed dietary and behavioral modifications for mgmt of urinary symptoms  Discussed weight management and benefits of weight loss on urinary symptoms  Reviewed AUA/SUFU guidelines on mgmt of non-neurogenic OAB  Discussed pharmacologic and nonpharmacologic mgmt options of urinary symptoms - reviewed risks, benefits, alternatives, and goals of treatment  Discussed specific risks related to OAB meds including, but not limited to dry mouth, constipation, blurry vision, cognitive changes, and BP elevation.    Discussed management of pelvic organ prolapse including but not limited to behavioral modifications, conservative options, and surgical management.   Discussed pessary management including benefits and risks. Discussed importance of keeping regularly scheduled pessary checks in prevention of complications related to pessary use.     Known bulge, using pessary but notes bulge around pessary  Considering surgical options  Considering surgical management of prolapse    Questions answered  She's interested in [facility] with D&C, poss polypectomy  UDS in the meantime  Consider pessary trial of alternate style  She understands and agrees plan

## 2025-07-21 ENCOUNTER — HOSPITAL ENCOUNTER (OUTPATIENT)
Facility: HOSPITAL | Age: 80
Setting detail: HOSPITAL OUTPATIENT SURGERY
Discharge: HOME | End: 2025-07-21
Attending: OBSTETRICS & GYNECOLOGY | Admitting: OBSTETRICS & GYNECOLOGY
Payer: MEDICARE

## 2025-07-21 ENCOUNTER — ANESTHESIA EVENT (OUTPATIENT)
Dept: SURGERY | Facility: HOSPITAL | Age: 80
End: 2025-07-21
Payer: MEDICARE

## 2025-07-21 ENCOUNTER — ANESTHESIA (OUTPATIENT)
Dept: SURGERY | Facility: HOSPITAL | Age: 80
End: 2025-07-21
Payer: MEDICARE

## 2025-07-21 VITALS
HEART RATE: 65 BPM | OXYGEN SATURATION: 98 % | TEMPERATURE: 99 F | HEIGHT: 63 IN | BODY MASS INDEX: 17.89 KG/M2 | SYSTOLIC BLOOD PRESSURE: 100 MMHG | WEIGHT: 101 LBS | DIASTOLIC BLOOD PRESSURE: 61 MMHG | RESPIRATION RATE: 20 BRPM

## 2025-07-21 PROCEDURE — 88305 TISSUE EXAM BY PATHOLOGIST: CPT | Performed by: OBSTETRICS & GYNECOLOGY

## 2025-07-21 RX ORDER — ONDANSETRON 2 MG/ML
INJECTION INTRAMUSCULAR; INTRAVENOUS AS NEEDED
Status: DISCONTINUED | OUTPATIENT
Start: 2025-07-21 | End: 2025-07-21 | Stop reason: SURG

## 2025-07-21 RX ORDER — SODIUM CHLORIDE, SODIUM LACTATE, POTASSIUM CHLORIDE, CALCIUM CHLORIDE 600; 310; 30; 20 MG/100ML; MG/100ML; MG/100ML; MG/100ML
INJECTION, SOLUTION INTRAVENOUS CONTINUOUS
Status: DISCONTINUED | OUTPATIENT
Start: 2025-07-21 | End: 2025-07-21

## 2025-07-21 RX ORDER — MIDAZOLAM HYDROCHLORIDE 1 MG/ML
1 INJECTION INTRAMUSCULAR; INTRAVENOUS EVERY 5 MIN PRN
Status: DISCONTINUED | OUTPATIENT
Start: 2025-07-21 | End: 2025-07-21

## 2025-07-21 RX ORDER — HYDROCODONE BITARTRATE AND ACETAMINOPHEN 5; 325 MG/1; MG/1
2 TABLET ORAL ONCE AS NEEDED
Refills: 0 | Status: DISCONTINUED | OUTPATIENT
Start: 2025-07-21 | End: 2025-07-21

## 2025-07-21 RX ORDER — ACETAMINOPHEN 500 MG
1000 TABLET ORAL ONCE
Status: DISCONTINUED | OUTPATIENT
Start: 2025-07-21 | End: 2025-07-21 | Stop reason: HOSPADM

## 2025-07-21 RX ORDER — METOCLOPRAMIDE HYDROCHLORIDE 5 MG/ML
10 INJECTION INTRAMUSCULAR; INTRAVENOUS EVERY 8 HOURS PRN
Status: DISCONTINUED | OUTPATIENT
Start: 2025-07-21 | End: 2025-07-21

## 2025-07-21 RX ORDER — ONDANSETRON 2 MG/ML
4 INJECTION INTRAMUSCULAR; INTRAVENOUS EVERY 6 HOURS PRN
Status: DISCONTINUED | OUTPATIENT
Start: 2025-07-21 | End: 2025-07-21

## 2025-07-21 RX ORDER — ACETAMINOPHEN 500 MG
1000 TABLET ORAL ONCE AS NEEDED
Status: DISCONTINUED | OUTPATIENT
Start: 2025-07-21 | End: 2025-07-21

## 2025-07-21 RX ORDER — MEPERIDINE HYDROCHLORIDE 25 MG/ML
12.5 INJECTION INTRAMUSCULAR; INTRAVENOUS; SUBCUTANEOUS ONCE
Refills: 0 | Status: DISCONTINUED | OUTPATIENT
Start: 2025-07-21 | End: 2025-07-21

## 2025-07-21 RX ORDER — HYDROMORPHONE HYDROCHLORIDE 1 MG/ML
0.6 INJECTION, SOLUTION INTRAMUSCULAR; INTRAVENOUS; SUBCUTANEOUS EVERY 5 MIN PRN
Refills: 0 | Status: DISCONTINUED | OUTPATIENT
Start: 2025-07-21 | End: 2025-07-21

## 2025-07-21 RX ORDER — HYDROCODONE BITARTRATE AND ACETAMINOPHEN 5; 325 MG/1; MG/1
1 TABLET ORAL ONCE AS NEEDED
Refills: 0 | Status: DISCONTINUED | OUTPATIENT
Start: 2025-07-21 | End: 2025-07-21

## 2025-07-21 RX ORDER — HYDROMORPHONE HYDROCHLORIDE 1 MG/ML
0.2 INJECTION, SOLUTION INTRAMUSCULAR; INTRAVENOUS; SUBCUTANEOUS EVERY 5 MIN PRN
Refills: 0 | Status: DISCONTINUED | OUTPATIENT
Start: 2025-07-21 | End: 2025-07-21

## 2025-07-21 RX ORDER — HYDROMORPHONE HYDROCHLORIDE 1 MG/ML
0.4 INJECTION, SOLUTION INTRAMUSCULAR; INTRAVENOUS; SUBCUTANEOUS EVERY 5 MIN PRN
Refills: 0 | Status: DISCONTINUED | OUTPATIENT
Start: 2025-07-21 | End: 2025-07-21

## 2025-07-21 RX ORDER — LIDOCAINE HYDROCHLORIDE 10 MG/ML
INJECTION, SOLUTION EPIDURAL; INFILTRATION; INTRACAUDAL; PERINEURAL AS NEEDED
Status: DISCONTINUED | OUTPATIENT
Start: 2025-07-21 | End: 2025-07-21 | Stop reason: SURG

## 2025-07-21 RX ORDER — KETOROLAC TROMETHAMINE 30 MG/ML
INJECTION, SOLUTION INTRAMUSCULAR; INTRAVENOUS AS NEEDED
Status: DISCONTINUED | OUTPATIENT
Start: 2025-07-21 | End: 2025-07-21 | Stop reason: SURG

## 2025-07-21 RX ORDER — NALOXONE HYDROCHLORIDE 0.4 MG/ML
0.08 INJECTION, SOLUTION INTRAMUSCULAR; INTRAVENOUS; SUBCUTANEOUS AS NEEDED
Status: DISCONTINUED | OUTPATIENT
Start: 2025-07-21 | End: 2025-07-21

## 2025-07-21 RX ADMIN — SODIUM CHLORIDE, SODIUM LACTATE, POTASSIUM CHLORIDE, CALCIUM CHLORIDE: 600; 310; 30; 20 INJECTION, SOLUTION INTRAVENOUS at 11:23:00

## 2025-07-21 RX ADMIN — SODIUM CHLORIDE, SODIUM LACTATE, POTASSIUM CHLORIDE, CALCIUM CHLORIDE: 600; 310; 30; 20 INJECTION, SOLUTION INTRAVENOUS at 11:46:00

## 2025-07-21 RX ADMIN — LIDOCAINE HYDROCHLORIDE 50 MG: 10 INJECTION, SOLUTION EPIDURAL; INFILTRATION; INTRACAUDAL; PERINEURAL at 11:27:00

## 2025-07-21 RX ADMIN — KETOROLAC TROMETHAMINE 15 MG: 30 INJECTION, SOLUTION INTRAMUSCULAR; INTRAVENOUS at 11:44:00

## 2025-07-21 RX ADMIN — ONDANSETRON 4 MG: 2 INJECTION INTRAMUSCULAR; INTRAVENOUS at 11:31:00

## 2025-07-21 NOTE — H&P
80 y/o female with PMB, thickened endometrium for surgical mgmt  Pre operative clearance with Dr Workman (hard copy provided), pt seen & examined without changes.    Thorough discussion of surgical risks, benefits, and alternatives including, but not limited to bleeding/clots, infection, injury to nearby organs (urethra, bladder, ureters, bowel, blood vessels), dyspareunia, de stiven UI, worsening UI, recurrence, voiding dysfunction, and pain.    Discussed pain mgmt and potential need for narcotics. Discussed addiction potential with narcotics. [place]  reviewed.    Plan to proceed with [facility] with D&C, poss polypectomy as consented  All questions answered.   Gabriela Roblero

## 2025-07-21 NOTE — OPERATIVE REPORT
Pre Op: thickened endometrium, PMB  Post op: same     Procedure: hysteroscopy with dilation, curettage (TruClear)     Surgeon: MAYELIN Roblero DO     No complications     Findings: atrophic endometrium, photos taken. Hemostasis upon completion.     Specimen: endometrial curettings     EBL: 0cc  UOP: 200cc     Anesthesia: MAC    PROCEDURE:  The patient was taken to the operating room, with IV running after informed consent was obtained.    She was placed in the supine position and induced under MAC anesthesia.    The patient was then placed in the dorsal lithotomy position and prepped and draped in the usual sterile fashion.     Bladder emptied   Cervix was noted inside the introitus, tenaculum was placed on anterior lip and cervix dilated to 6. Hysteroscope was introduced and bilateral fallopain tubeostia. Atrophic endometrium  TruClear device used to clear endometrium  Several passes made with curet, smooth. Scant endometrial specimen removed with curettings. Curetting performed to gritty texture circumferentially.     105cc total fluid deficit  Tenaculum removed from cervix. Hemostasis noted  Cervix & uterus reduced into vagina.      Specimen sent to pathology.     The patient was then removed from the dorsal lithotomy position, awakened, and transferred from the operating room to the same day surgery area in stable condition, having tolerated the procedure well.    Sponge, lap, & needle counts were correct.

## 2025-07-21 NOTE — DISCHARGE INSTRUCTIONS
[facility]     Post-Operative Guidelines      AVOID CONSTIPATION - Take Miralax: one capful in water or juice each morning.  You can also take each evening if needed. Use milk of magnesia daily as needed to avoid straining with BMs    It is okay to walk up and down stairs and to walk outside, but be careful not to become fatigued.  Showers and tub baths are okay, even if you have a catheter or abdominal incision.  You may ride in a car immediately.      Please call us for any of the following:  Temperature above 100.5 for 4 hours or above 101.0 at any time  Chest pain or trouble breathing  Vaginal bleeding heavier than a period  Redness, tenderness or swelling of your legs  Pain or burning when you urinate  Redness, pain or foul discharge from incision    Office telephone, [phone], after hours [phone]    You received a drug called Toradol which is an Anti Inflammatory at: 11:44am.  If you are allowed to take Anti inflammatories:    Do not take any Anti Inflammatory like Motrin, Aleve or Ibuprophen until after: 5:44pm.  Please report any suspected allergic reactions or bleeding issues to your doctor

## 2025-07-21 NOTE — ANESTHESIA POSTPROCEDURE EVALUATION
[facility]    Vee Andrews Patient Status:  Hospital Outpatient Surgery   Age/Gender 79 year old female MRN [id number]   Location [facility] PERIOPERATIVE SERVICE Attending Gabriela Roblero DO   Hosp Day # 0 PCP Ousmane Workman MD       Anesthesia Post-op Note    HYSTEROSCOPY WITH DILATION AND CURETTAGE    Procedure Summary       Date: 07/21/25 Room / Location:  MAIN OR [address] [place] [address] MAIN OR    Anesthesia Start: 1123 Anesthesia Stop: 1201    Procedure: HYSTEROSCOPY WITH DILATION AND CURETTAGE (Uterus) Diagnosis: (POSTMENOPAUSAL BLEEDING, THICKENED ENDOMETRIUM)    Surgeons: Gabriela Roblero DO Anesthesiologist: Jacinto Childs MD    Anesthesia Type: MAC ASA Status: 2            Anesthesia Type: No value filed.    Vitals Value Taken Time   /66 07/21/25 12:01   Temp 36.2 07/21/25 12:03   Pulse 58 07/21/25 12:02   Resp 16 07/21/25 12:03   SpO2 100 % 07/21/25 12:02   Vitals shown include unfiled device data.        Patient Location: Same Day Surgery    Anesthesia Type: MAC    Airway Patency: patent    Postop Pain Control: adequate    Mental Status: preanesthetic baseline    Nausea/Vomiting: none    Cardiopulmonary/Hydration status: stable euvolemic    Complications: no apparent anesthesia related complications    Postop vital signs: stable    Dental Exam: Unchanged from Preop    Patient to be discharged from PACU when criteria met.

## 2025-07-21 NOTE — ANESTHESIA PREPROCEDURE EVALUATION
PRE-OP EVALUATION    Patient Name: Vee Andrews    Admit Diagnosis: POSTMENOPAUSAL BLEEDING, THICKENED ENDOMETRIUM    Pre-op Diagnosis: POSTMENOPAUSAL BLEEDING, THICKENED ENDOMETRIUM    HYSTEROSCOPY WITH DILATION AND CURETTAGE, POSSIBLE POLYPECTOMY    Anesthesia Procedure: HYSTEROSCOPY WITH DILATION AND CURETTAGE, POSSIBLE POLYPECTOMY    Surgeons and Role:     * Gabriela Roblreo, DO - Primary    Pre-op vitals reviewed.  Temp: 98 °F (36.7 °C)  Pulse: 58  Resp: 14  BP: 116/72  SpO2: 100 %  Body mass index is 17.89 kg/m².    Current medications reviewed.  Hospital Medications:  Current Medications[1]    Outpatient Medications:   Prescriptions Prior to Admission[2]    Allergies: Cat dander [cat hair extract]      Anesthesia Evaluation    Patient summary reviewed.    Anesthetic Complications  (-) history of anesthetic complications         GI/Hepatic/Renal                            (+) irritable bowel syndrome     Cardiovascular    Negative cardiovascular ROS.    Exercise tolerance: good     MET: >4         (+) hyperlipidemia                                  Endo/Other           (+) hypothyroidism                       Pulmonary    Negative pulmonary ROS.                       Neuro/Psych      (+) depression                              Past Surgical History[3]  Social Hx on file[4]  History   Drug Use No     Available pre-op labs reviewed.               Airway      Mallampati: II  Mouth opening: >3 FB  TM distance: > 6 cm  Neck ROM: full Cardiovascular    Cardiovascular exam normal.  Rhythm: regular  Rate: normal  (-) murmur   Dental    Dentition appears grossly intact         Pulmonary    Pulmonary exam normal.  Breath sounds clear to auscultation bilaterally.               Other findings        ASA: 2   Plan: MAC  NPO status verified and patient meets guidelines.        Comment: MAC discussed.  All questions answered.  Patient expressed understanding and agrees to proceed.    Plan/risks discussed with:  patient and spouse            Present on Admission:  **None**               [1]  • acetaminophen (Tylenol Extra Strength) tab 1,000 mg  1,000 mg Oral Once   • lactated ringers infusion   Intravenous Continuous   [2]  Medications Prior to Admission   Medication Sig Dispense Refill Last Dose/Taking   • ESTRADIOL 0.1 MG/GM Vaginal Cream APPLY 1 GRAM VAGINALLY 3 TIMES EVERY WEEK 42.5 g 3 7/18/2025   • dorzolamide-timolol 2-0.5 % Ophthalmic Solution Place 1 drop into the left eye in the morning and 1 drop before bedtime.   7/20/2025   • latanoprost 0.005 % Ophthalmic Solution Apply 1 drop to eye nightly.   7/20/2025   • rosuvastatin 20 MG Oral Tab Take 1 tablet (20 mg total) by mouth every other day.   7/20/2025   • suvorexant 20 MG Oral Tab Take 0.5 tablets by mouth nightly as needed.   7/20/2025   • levothyroxine 100 MCG Oral Tab Take 1 tablet (100 mcg total) by mouth in the morning.   7/20/2025   • polyethylene glycol, PEG 3350, 17 g Oral Powd Pack Take 17 g by mouth 2 (two) times a day.   7/20/2025   • Multiple Vitamin (ONE-DAILY MULTI VITAMINS) Oral Tab Take 1 tablet by mouth in the morning.   7/20/2025   • ARIPiprazole 2 MG Oral Tab Take 1.5 tablets (3 mg total) by mouth in the morning.   7/20/2025   • FLUoxetine HCl (PROZAC) 20 MG Oral Cap Take 1 capsule (20 mg total) by mouth in the morning.   7/20/2025   • Cholecalciferol (VITAMIN D) 1000 UNITS Oral Cap Take by mouth.   7/20/2025   [3]  Past Surgical History:  Procedure Laterality Date   • Colonoscopy  1/23/2014    Procedure: COLONOSCOPY;  Surgeon: Russell Dean MD;  Location:  ENDOSCOPY   • Colonoscopy,diagnostic  1/04    negative   • Colonoscopy,diagnostic  1/24/14    normal   • Egd N/A 10/25/2021    Procedure: ESOPHAGOGASTRODUODENOSCOPY, COLONOSCOPY, POSSIBLE BIOPSY, POSSIBLE POLYPECTOMY;  Surgeon: Akbar Jenkins MD;  Location: [facility]   • Jennifer biopsy stereo nodule 1 site right (cpt=[id number]) Right 2009    benign.   • Orthopedic surg (pbp)       bunionectomy   • Other surgical history      parathyroidectomy   • Tubal ligation     [4]  Social History  Socioeconomic History   • Marital status:    Tobacco Use   • Smoking status: Never   • Smokeless tobacco: Never   Vaping Use   • Vaping status: Never Used   Substance and Sexual Activity   • Alcohol use: Yes     Comment: rare   • Drug use: No

## 2025-07-22 ENCOUNTER — TELEPHONE (OUTPATIENT)
Dept: UROLOGY | Facility: HOSPITAL | Age: 80
End: 2025-07-22

## 2025-07-22 NOTE — TELEPHONE ENCOUNTER
TC from pt via answering svc mon pm (6pm)  She had some burning with urination, increase PO hydration  Answered questions

## 2025-07-22 NOTE — TELEPHONE ENCOUNTER
F/u call to patient  She resumed pessary use this AM  Reports improvement in voiding sx with pessary use  Continue pessary management  Pt to call with any UTI s/sx  F/u as planned

## 2025-07-22 NOTE — TELEPHONE ENCOUNTER
TC to pt following surgery  Doing well, no complaints  Voids freely but reports feeling like she had decreased urine output yesterday  Reports episode of dysuria x1 yesterday, improved today  Pain controlled with PO meds (tylenol  Had BM yesterday and this AM  Tolerates ambulation  No heavy vaginal bleeding, some spotting  No CP or SOB  Rec increased fluids, call with UTI s/sx  All questions answered  Encouraged her to call with questions or concerns  Follow up as scheduled  She understands and agrees to plan    Brenda Magana PA-C

## 2025-07-22 NOTE — TELEPHONE ENCOUNTER
TC from pt via answering svc this AM  She reports decreased urine output  Encouraged her to increase PO hydration and replace pessary to improve voids  If sx persist she'll need an office visit  Questions answered  She understands and agrees to plan

## 2025-07-25 ENCOUNTER — TELEPHONE (OUTPATIENT)
Dept: UROLOGY | Facility: CLINIC | Age: 80
End: 2025-07-25

## 2025-07-25 NOTE — TELEPHONE ENCOUNTER
Called pt and confirmed UDS appointment, reviewed instructions - patient was not at home- LM with  - ( HIPAA authorized) instructed to have patient call back for further questions

## 2025-07-28 ENCOUNTER — OFFICE VISIT (OUTPATIENT)
Dept: UROLOGY | Facility: CLINIC | Age: 80
End: 2025-07-28
Attending: OBSTETRICS & GYNECOLOGY
Payer: MEDICARE

## 2025-07-28 VITALS
BODY MASS INDEX: 17.89 KG/M2 | HEIGHT: 63 IN | WEIGHT: 101 LBS | DIASTOLIC BLOOD PRESSURE: 58 MMHG | SYSTOLIC BLOOD PRESSURE: 132 MMHG

## 2025-07-28 DIAGNOSIS — N81.6 RECTOCELE: ICD-10-CM

## 2025-07-28 DIAGNOSIS — R35.1 NOCTURIA: ICD-10-CM

## 2025-07-28 DIAGNOSIS — N81.11 CYSTOCELE, MIDLINE: ICD-10-CM

## 2025-07-28 DIAGNOSIS — N95.2 POSTMENOPAUSAL ATROPHIC VAGINITIS: ICD-10-CM

## 2025-07-28 DIAGNOSIS — N39.41 URGE URINARY INCONTINENCE: ICD-10-CM

## 2025-07-28 DIAGNOSIS — N81.2 INCOMPLETE UTEROVAGINAL PROLAPSE: Primary | ICD-10-CM

## 2025-07-28 LAB
BLOOD URINE: NEGATIVE
CONTROL RUN WITHIN 24 HOURS?: YES
LEUKOCYTE ESTERASE URINE: NEGATIVE
NITRITE URINE: NEGATIVE

## 2025-07-28 PROCEDURE — 51741 ELECTRO-UROFLOWMETRY FIRST: CPT

## 2025-07-28 PROCEDURE — 51729 CYSTOMETROGRAM W/VP&UP: CPT

## 2025-07-28 PROCEDURE — 51784 ANAL/URINARY MUSCLE STUDY: CPT

## 2025-07-28 PROCEDURE — 51797 INTRAABDOMINAL PRESSURE TEST: CPT

## 2025-07-28 PROCEDURE — 81002 URINALYSIS NONAUTO W/O SCOPE: CPT

## 2025-07-28 RX ORDER — ESTRADIOL 0.1 MG/G
1 CREAM VAGINAL
Qty: 42.5 G | Refills: 3 | Status: SHIPPED | OUTPATIENT
Start: 2025-07-28

## 2025-07-28 RX ORDER — ESTRADIOL 0.1 MG/G
1 CREAM VAGINAL
Qty: 42.5 G | Refills: 3 | Status: SHIPPED | OUTPATIENT
Start: 2025-07-28 | End: 2025-07-28 | Stop reason: CLARIF

## 2025-07-28 NOTE — PROCEDURES
Patient here for urodynamic testing.  Procedure explained and confirmed by patient.  See evaluation form for results.  Both verbal and written discharge instructions were given.  Patient tolerated procedure well and will follow up with Dr. Francisco King on 25.    URODYNAMIC EVALUATION    PATIENT HISTORY:    Prolapse:  Yes, #5 ring with support pessary-home care q 2 wks  GILMER:  Yes, rare  UUI:  Yes, mostly overnight with nocturia  Nocturia:  3  Frequency:  q 2hrs  Sense of Incomplete Emptying:  No  Constipation:  No, moves bowels daily, some straining at times  Last void prior to UDS testin+min  Current urge to void?  Moderate  OAB meds stopped prior to test?  NA  Other symptoms?  S/P hysteroscopy, D&C 25.  Reports some intermittent vaginal spotting. Resumed vag Estrace after surgery as directed    Surgery?  [x]  No  [x]  Interested in surgery:undecided   []  Yes, specify date:    Surgical folder provided?  []  Yes  [x]  No     PATIENT DIAGNOSIS:  Cystocele, Midline N81.11, Rectocele, Midline R15.9, Frequency R35.0, Urge Incontinence N39.41, Stress Incontinence N39.3, and Uterovaginal Prolapse N81.2 (incomplete)    UDS PROCEDURAL FINDINGS:  Incomplete Bladder Emptying R39.14 and Detrusor Instability N31.9    MEDICATION: Medications Taking[1]     ALLERGIES:  Cat dander [cat hair extract]      EXAM:  Urinalysis Dip:  Today's Results   Component Date Value    control run 2025 Yes     Blood Urine 2025 Negative     Nitrite Urine 2025 Negative     Leukocyte esterase urine 2025 Negative       Urovesico Junction ( <30 degrees ):  []  Mobile  [x]  Fixed    Perineal Sensation:  [x]  Normal  []  Abnormal    Additional Notes:    PROLAPSE:  [x]  Yes  []  No  Prolapse reduced for testing?  [x]  Yes  []  No  [x]  Pessary, patients own #5 ring with support  []  Manual  []  Half Speculum    Additional Notes:  Pt arrived to appt with pessary in hand.  Pessary placed after uroflow as indicated  below    UROFLOWMETRY:  Unreduced  Voided Volume:              272               mL  Maximum Flow Rate:                       13         mL/sec  Average flow rate:                 5            mL/sec  Post-void Residual:              105             mL  Pattern:  []  Normal  []  Poor flow     [x]  Intermittent  []  Other  Void:   [x]  Typical  []  Atypical    Additional Notes:    CYSTOMETRY:  Urethral Catheter:  Fr 7 / tdoc  Abd Catheter:     Fr 7 / tdoc   Infusion:  Water Rate 30 mL/min  Temp:  Room  Position:  [x]  Sit  []  Stand  []  Supine  First sensation:   91 mL  First desire to void:   193 mL  Strong desire to void:  238 mL  Maximum cystometric capacity:   278 mL  Detrusor Activity:  [x]  Unstable   []  Stable  Urge leakage?    []  Yes [x]  No  Volume at 1st unhibited detrusor cont:   101 mL  Detrusor instability provoked by:    []  Spontaneous []  Coughing  [x]  Filling  []  Valsalva  []  Other    Additional Notes:      URETHRAL FUNCTION:  Valsava (vesical) Leak Point Pressures:    Volume Leak Point Pressure Leak?    Cough Valsalva      100mL 66 60    cm H2O []  Yes [x]  No       REDUCED: #5 ring with support pessary  Volume Leak Point Pressure Leak?    Cough Valsalva      100mL 70 54    cm H2O []  Yes X  No   200mL 71  61   cm H2O []  Yes [x]  No   Maximum Cystometric Capacity  278mL 80 65 cm H2O []  Yes [x]  No     Genuine Stress Incontinence demonstrated?   []  Yes  [x]  No    Resting Urethral Pressure Profile:     Functional Urethral Length: manual pull-unable to assess  Maximum UCP:          66 cm        45     cm       PRESSURE/FLOW STUDY:  Reduced  Voided volume:   317+     mL  Maximum flow rate:      11  mL/sec  Pressure Detrusor (at maximum flow):        43    cm H2O  Post void residual:       20        mL  Voiding mechanism:  []  Abnormal  []  Normal  []  Strain to void   []  Weak detrusor    Additional Notes:    Pt voided about 89mL, still felt strong urge to void but unable to pass urine. PVES  removed, pt voided additional volume to a total of 317mL + as noted for pressure flow study.   Patients #5 ring with support pessary left in place per patient request.  Uroflowmetry, cystometry, and pressure / flow study were completed using calibrated electronic equipment and air charged transducers.    EMG:  During fill: Reactive    During flow study: Reactive    7/28/2025 3:35 PM     PERFORMED BY:  Humaira ROBERTS RN                 [1]   Outpatient Medications Marked as Taking for the 7/28/25 encounter (Office Visit) with LIS PROCEDURE   Medication Sig Dispense Refill    estradiol 0.1 MG/GM Vaginal Cream Place 1 g vaginally 3 (three) times a week. 42.5 g 3    dorzolamide-timolol 2-0.5 % Ophthalmic Solution Place 1 drop into the left eye in the morning and 1 drop before bedtime.      latanoprost 0.005 % Ophthalmic Solution Apply 1 drop to eye nightly.      rosuvastatin 20 MG Oral Tab Take 1 tablet (20 mg total) by mouth every other day.      suvorexant 20 MG Oral Tab Take 0.5 tablets by mouth nightly as needed.      levothyroxine 100 MCG Oral Tab Take 1 tablet (100 mcg total) by mouth in the morning.      polyethylene glycol, PEG 3350, 17 g Oral Powd Pack Take 17 g by mouth 2 (two) times a day.      Multiple Vitamin (ONE-DAILY MULTI VITAMINS) Oral Tab Take 1 tablet by mouth in the morning.      ARIPiprazole 2 MG Oral Tab Take 1.5 tablets (3 mg total) by mouth in the morning.      FLUoxetine HCl (PROZAC) 20 MG Oral Cap Take 1 capsule (20 mg total) by mouth in the morning.      Cholecalciferol (VITAMIN D) 1000 UNITS Oral Cap Take by mouth.

## 2025-07-28 NOTE — PATIENT INSTRUCTIONS
[facility] UROGYNECOLOGY  [address] [address]  PH: [phone]  FAX: [phone]       Urodynamic Testing Discharge Instructions:    There are NO dietary or activity restrictions.  You may resume your normal schedule.      You may have mild discomfort for a few hours after your testing today.  There may be some mild burning when you urinate or you may see some blood in your urine.  These problems should not last more than 24 hours.  The following suggestions may minimize any symptoms you experience.    Drink 6-8 large glasses of water over the next 8 hours  A compress or sitz bath may be soothing  Tylenol or Ibuprofen may be taken as needed    If you experience any of the following, please call the office or, if after hours, the on-call physician at [phone].    Excessive pain  Bright red bloody discharge  Fever or chills  Continued urgency, frequency or burning with urination    Obtaining Test Results    Your urodynamic test will be interpreted by a specialist and available to the referring physician within 7-14 days.  Patients in our clinic are given an appointment to come back to discuss the results and any appropriate treatment recommendations.    Please do not hesitate to contact our office with any questions or concerns at [phone].    I acknowledge that I have received verbal and written discharge  instructions and that I understand these instructions clearly.    Patient Signature:    Date:

## 2025-08-07 ENCOUNTER — TELEPHONE (OUTPATIENT)
Dept: UROLOGY | Facility: CLINIC | Age: 80
End: 2025-08-07

## 2025-08-08 ENCOUNTER — OFFICE VISIT (OUTPATIENT)
Dept: UROLOGY | Facility: CLINIC | Age: 80
End: 2025-08-08
Attending: OBSTETRICS & GYNECOLOGY

## 2025-08-08 VITALS — WEIGHT: 101 LBS | HEIGHT: 63 IN | RESPIRATION RATE: 18 BRPM | TEMPERATURE: 98 F | BODY MASS INDEX: 17.89 KG/M2

## 2025-08-08 DIAGNOSIS — N81.2 INCOMPLETE UTEROVAGINAL PROLAPSE: ICD-10-CM

## 2025-08-08 DIAGNOSIS — N32.81 OAB (OVERACTIVE BLADDER): ICD-10-CM

## 2025-08-08 DIAGNOSIS — N81.6 RECTOCELE: ICD-10-CM

## 2025-08-08 DIAGNOSIS — N95.2 POSTMENOPAUSAL ATROPHIC VAGINITIS: Primary | ICD-10-CM

## 2025-08-08 DIAGNOSIS — N81.11 CYSTOCELE, MIDLINE: ICD-10-CM

## 2025-08-08 PROCEDURE — 57160 INSERT PESSARY/OTHER DEVICE: CPT | Performed by: OBSTETRICS & GYNECOLOGY

## 2025-08-08 PROCEDURE — 99212 OFFICE O/P EST SF 10 MIN: CPT

## (undated) DEVICE — GLOVE SUR 6 SENSICARE PI PIP CRM PWD F

## (undated) DEVICE — SET TB INFLO FOR TRUCLEAR SYS HYSTEROLUX

## (undated) DEVICE — Device

## (undated) DEVICE — SOCK SPEC L9IN STD PLAS FR POUR/ACC PRT

## (undated) DEVICE — SOLUTION IRRIG 1000ML 0.9% NACL USP BTL

## (undated) DEVICE — MORCELLATOR HYSTEROSCOPIC MINI SFT TISS

## (undated) DEVICE — STICK SPNG 8IN MED POVIDONE IOD PAINT

## (undated) DEVICE — SLEEVE COMPR MD KNEE LEN SGL USE KENDALL SCD

## (undated) DEVICE — PACK GYNE CUSTOM

## (undated) DEVICE — SET HYSTEROSCOPIC OUTFLO TB DISP FOR FLD MGMT

## (undated) DEVICE — SEAL HYSTEROSCOPE TRUCLEAR

## (undated) DEVICE — SOLUTION IRRIG 3000ML 0.9% NACL FLX CONT